# Patient Record
Sex: FEMALE | Race: WHITE | URBAN - METROPOLITAN AREA
[De-identification: names, ages, dates, MRNs, and addresses within clinical notes are randomized per-mention and may not be internally consistent; named-entity substitution may affect disease eponyms.]

---

## 2021-06-16 ENCOUNTER — OFFICE VISIT (OUTPATIENT)
Dept: NEUROSURGERY | Age: 56
End: 2021-06-16
Payer: COMMERCIAL

## 2021-06-16 VITALS — WEIGHT: 147 LBS | BODY MASS INDEX: 22.28 KG/M2 | HEIGHT: 68 IN | TEMPERATURE: 97.1 F

## 2021-06-16 DIAGNOSIS — M48.061 SPINAL STENOSIS OF LUMBAR REGION WITHOUT NEUROGENIC CLAUDICATION: ICD-10-CM

## 2021-06-16 DIAGNOSIS — M54.16 LUMBAR RADICULOPATHY: ICD-10-CM

## 2021-06-16 DIAGNOSIS — M51.26 LUMBAR DISC HERNIATION: Primary | ICD-10-CM

## 2021-06-16 DIAGNOSIS — M47.816 LUMBAR SPONDYLOSIS: ICD-10-CM

## 2021-06-16 DIAGNOSIS — M51.36 LUMBAR DEGENERATIVE DISC DISEASE: ICD-10-CM

## 2021-06-16 PROCEDURE — 99213 OFFICE O/P EST LOW 20 MIN: CPT | Performed by: NEUROLOGICAL SURGERY

## 2021-06-16 RX ORDER — GABAPENTIN 400 MG/1
400 CAPSULE ORAL 3 TIMES DAILY
Qty: 90 CAPSULE | Refills: 3 | Status: SHIPPED | OUTPATIENT
Start: 2021-06-16 | End: 2021-08-23 | Stop reason: SDUPTHER

## 2021-06-16 RX ORDER — GABAPENTIN 400 MG/1
400 CAPSULE ORAL 3 TIMES DAILY
Qty: 90 CAPSULE | Refills: 0 | Status: SHIPPED | OUTPATIENT
Start: 2021-06-16 | End: 2021-06-16 | Stop reason: SDUPTHER

## 2021-06-16 ASSESSMENT — ENCOUNTER SYMPTOMS
CONSTIPATION: 0
DIARRHEA: 0
BACK PAIN: 1
NAUSEA: 0
SHORTNESS OF BREATH: 0

## 2021-06-16 NOTE — PROGRESS NOTES
[facility] ([facility]) Physicians  Neurosurgery and Pain [address] [facility]: [phone]  F: [phone]       Flavio Mayfield  (1965)    6/16/2021    Referred by No ref. provider found    Subjective:     Flavio Mayfield is 64 y.o. female who complains today of:    Chief Complaint   Patient presents with    Back Pain     She is here for follow up with increased pain after stopping Neurontin. Feels worse compared to last visit. Previous physical therapy. [age]-year-old female with a five-year history of chronic back pain with similar radiation to the back and right leg which was relieved with conservative treatment. She has been suffering from intermittent back discomfort along with her diagnosis of rheumatoid arthritis. About a eight months ago without any preceding injury or trauma, there has been significant pain in the back and the right leg down to the foot with associated numbness and paresthesia. The pain is worse with activities and movement. No left-sided complaints. No bowel or bladder incontinence. She denies any weakness fortunately. She sees Dr. Alexandrea Gonzalez, pain management. She did cut her left finger on the knuckle at work today and wearing bandage due to excessive bleeding.             Back Pain  Associated symptoms include leg pain. Pertinent negatives include no fever or headaches. Leg Pain         Allergies:  Patient has no known allergies. No past medical history on file. Past Surgical History:   Procedure Laterality Date    KNEE SURGERY       No family history on file.   Social History     Socioeconomic History    Marital status:      Spouse name: Not on file    Number of children: Not on file    Years of education: Not on file    Highest education level: Not on file   Occupational History    Not on file   Tobacco Use    Smoking status: Former Smoker    Smokeless tobacco: Never Used   Substance and Sexual Activity    Alcohol use: Not Currently    Drug use: Not Currently    Sexual activity: Not on file   Other Topics Concern    Not on file   Social History Narrative    Not on file     Social Determinants of Health     Financial Resource Strain:     Difficulty of Paying Living Expenses:    Food Insecurity:     Worried About Running Out of Food in the Last Year:     [address] in the Last Year:    Transportation Needs:     Lack of Transportation (Medical):  Lack of Transportation (Non-Medical):    Physical Activity:     Days of Exercise per Week:     Minutes of Exercise per Session:    Stress:     Feeling of Stress :    Social Connections:     Frequency of Communication with Friends and Family:     Frequency of Social Gatherings with Friends and Family:     Attends [employer] Services:     Active Member of Clubs or Organizations:     Attends Club or Organization Meetings:     Marital Status:    Intimate Partner Violence:     Fear of Current or Ex-Partner:     Emotionally Abused:     Physically Abused:     Sexually Abused:        Current Outpatient Medications on File Prior to Visit   Medication Sig Dispense Refill    diclofenac (VOLTAREN) 50 MG EC tablet Take 1 tablet by mouth 2 times daily (with meals) Take with food, avoid other NSAIDs (Ibuprofen) and steroids 60 tablet 0    meloxicam (MOBIC) 15 MG tablet Take 1 tablet by mouth daily Take with food, avoid other NSAIDs (Ibuprofen) and steroids 30 tablet 0    lidocaine (LMX) 4 % cream Apply a half dollar sized amount to intact skin topically up to twice daily as needed for pain 1 Tube 1    sulfaSALAzine (AZULFIDINE) 500 MG tablet 1 tablet      methotrexate (RHEUMATREX) 2.5 MG chemo tablet       Lisdexamfetamine Dimesylate (VYVANSE PO) Take by mouth       No current facility-administered medications on file prior to visit. Review of Systems   Constitutional: Negative for fever. HENT: Negative for hearing loss. Respiratory: Negative for shortness of breath. Gastrointestinal: Negative for constipation, diarrhea and nausea. Genitourinary: Negative for difficulty urinating. Musculoskeletal: Positive for back pain. Negative for neck pain. Skin: Negative for rash. Neurological: Negative for headaches. Hematological: Does not bruise/bleed easily. Psychiatric/Behavioral: Negative for sleep disturbance. Objective:     Vitals:  Temp 97.1 °F (36.2 °C)   Ht 5' 8\" (1.727 m)   Wt 147 lb (66.7 kg)   BMI 22.35 kg/m² Pain Score:   5      Physical Exam  Vitals and nursing note reviewed. HENT:      Head: Normocephalic and atraumatic. Mouth/Throat:      Mouth: Mucous membranes are moist.      Pharynx: Oropharynx is clear. Eyes:      Extraocular Movements: Extraocular movements intact. Pupils: Pupils are equal, round, and reactive to light. Cardiovascular:      Rate and Rhythm: Regular rhythm. Pulses: Normal pulses. Musculoskeletal:      Lumbar back: Tenderness present. Decreased range of motion. Skin:     General: Skin is warm and dry. Neurological:      Mental Status: She is alert and oriented to person, place, and time. Sensory: Sensory deficit (hypalgesia on the right shin and dorsum of the foot ) present. Gait: Gait normal.      Deep Tendon Reflexes: Babinski sign (Downgoing.) absent on the right side. Babinski sign (Downgoing.) absent on the left side. Reflex Scores:       Patellar reflexes are 0 on the right side and 0 on the left side. Achilles reflexes are 0 on the right side and 0 on the left side. Comments: Wearing back brace. 5/5 bilateral quadriceps, iliopsoas, gastrocnemius, TA and EHL. Positive right straight leg-raise with positive Lasegue's. Negative bilateral straight leg-raise. No ankle clonus bilaterally. Please note that there is evidence of right-sided knee surgery.        Psychiatric:         Mood and Affect: Mood normal. Assessment:      Diagnosis Orders   1. Lumbar disc herniation     2. Lumbar radiculopathy  gabapentin (NEURONTIN) 400 MG capsule    DISCONTINUED: gabapentin (NEURONTIN) 400 MG capsule   3. Lumbar degenerative disc disease     4. Lumbar spondylosis     5. Spinal stenosis of lumbar region without neurogenic claudication         Plan:     Patient returns my office for continued follow-up. Has seen me previously with L4-5 severe degenerative changes spondylosis with mechanical back pain radiculopathy. Recent exacerbation of pain with a limited benefit from the injection after stopping gabapentin. Clinically unchanged stable motor and sensory findings. MRI of lumbar spine is again was reviewed by me. Impression: L4-5 severe lumbar spondylosis degenerative changes cause mechanical back pain and radiculopathy. Patient was given Neurontin refill hopefully this will help her continue to control the pain like she was during previous visit. She is to consider surgery of extensive decompression with a fusions mentation to prevent delayed instability at L4-5 as absolute last resort. And she is to continue follow-up with the pain management including further refills. Orders Placed This Encounter   Medications    DISCONTD: gabapentin (NEURONTIN) 400 MG capsule     Sig: Take 1 capsule by mouth 3 times daily for 30 days. Dispense:  90 capsule     Refill:  0    gabapentin (NEURONTIN) 400 MG capsule     Sig: Take 1 capsule by mouth 3 times daily for 30 days. Dispense:  90 capsule     Refill:  3     Cancel 1st rx with NO refills. Use this one. No orders of the defined types were placed in this encounter. Follow up:  No follow-ups on file.     Rogelio Salcedo MD

## 2021-07-14 DIAGNOSIS — M54.16 LUMBAR RADICULOPATHY: ICD-10-CM

## 2021-07-14 DIAGNOSIS — M54.2 NECK PAIN: ICD-10-CM

## 2021-07-14 RX ORDER — MELOXICAM 15 MG/1
15 TABLET ORAL DAILY
Qty: 30 TABLET | Refills: 0 | Status: SHIPPED | OUTPATIENT
Start: 2021-07-14 | End: 2021-08-23 | Stop reason: SDUPTHER

## 2021-08-10 DIAGNOSIS — M54.2 NECK PAIN: ICD-10-CM

## 2021-08-10 DIAGNOSIS — M54.16 LUMBAR RADICULOPATHY: ICD-10-CM

## 2021-08-10 NOTE — TELEPHONE ENCOUNTER
Requested Prescriptions     Pending Prescriptions Disp Refills    diclofenac (VOLTAREN) 50 MG EC tablet 60 tablet 0     Sig: Take 1 tablet by mouth 2 times daily (with meals) Take with food, avoid other NSAIDs (Ibuprofen) and steroids       Patient last seen on:  4/30  Date of last surgery:  n/a  Date of last refill:  7/14  Pain level:  n/a  Patient complaining of:  Pt asking for refill  Future appts: pt was called to schedule, she states she was just walking out of work and will call back to schedule.

## 2021-08-11 RX ORDER — GABAPENTIN 400 MG/1
400 CAPSULE ORAL 3 TIMES DAILY
Qty: 90 CAPSULE | Refills: 3 | OUTPATIENT
Start: 2021-08-11 | End: 2021-09-10

## 2021-08-23 ENCOUNTER — OFFICE VISIT (OUTPATIENT)
Dept: PAIN MANAGEMENT | Age: 56
End: 2021-08-23
Payer: COMMERCIAL

## 2021-08-23 VITALS
HEIGHT: 68 IN | BODY MASS INDEX: 23.19 KG/M2 | WEIGHT: 153 LBS | SYSTOLIC BLOOD PRESSURE: 135 MMHG | TEMPERATURE: 97.6 F | DIASTOLIC BLOOD PRESSURE: 80 MMHG

## 2021-08-23 DIAGNOSIS — M54.16 LUMBAR RADICULOPATHY: ICD-10-CM

## 2021-08-23 DIAGNOSIS — M54.2 NECK PAIN: ICD-10-CM

## 2021-08-23 DIAGNOSIS — M06.9 RHEUMATOID ARTHRITIS, INVOLVING UNSPECIFIED SITE, UNSPECIFIED WHETHER RHEUMATOID FACTOR PRESENT (HCC): ICD-10-CM

## 2021-08-23 DIAGNOSIS — M47.817 LUMBOSACRAL SPONDYLOSIS WITHOUT MYELOPATHY: Primary | ICD-10-CM

## 2021-08-23 PROCEDURE — 99214 OFFICE O/P EST MOD 30 MIN: CPT | Performed by: PHYSICAL MEDICINE & REHABILITATION

## 2021-08-23 RX ORDER — GABAPENTIN 400 MG/1
400 CAPSULE ORAL 4 TIMES DAILY
Qty: 120 CAPSULE | Refills: 0 | Status: SHIPPED | OUTPATIENT
Start: 2021-08-23 | End: 2021-10-05 | Stop reason: SDUPTHER

## 2021-08-23 ASSESSMENT — ENCOUNTER SYMPTOMS
NAUSEA: 0
BACK PAIN: 1
DIARRHEA: 0
SHORTNESS OF BREATH: 0
CONSTIPATION: 0

## 2021-08-23 NOTE — PROGRESS NOTES
Ita Burnette  (1965)    8/23/2021    Subjective:     Ita Burnette is 64 y.o. female who complains today of:    Chief Complaint   Patient presents with    Back Pain    Neck Pain    Leg Pain     Last seen by me 4/9/21: Saw Dr Donald Angela on 6/16/21 given Neurontin, consider fsion L4/5. Right Lumbar L4/5 L5/S1 TFESI on 4/30/21 with 50% pain relief. She wished these lasted longer. No new therapy. Didn't get XR neck done. Left arm is better, didn't get EMG done. Diclofenac has been very helpful. Had a hard time while off of this medicine. No other tests therapy or updates from other physicians, no ER visits. Diclofenac 50 mg BID and Gabapentin 400 mg QID help with remaining functional with chores, personal hygiene, remaining compliant with home exercise program, maintaining her quality of life, and performing activities of daily living. She obtains greater than 50% pain relief without any significant side effects. She is clear to avoid driving or operating heavy machinery or to perform any activities where she may harm herself or others while on pain medications. Low back pain is a 4/10. Gets to a 6/10. Located in the right low back. No leg pain. Worse with work and activity. Better with rest and pain medicine. Constant ache for over 4 years. There are no other associated symptoms or contextual factors. She denies any new weakness, saddle anesthesia, bowel or bladder dysfunction, or falls. Neck pain is currently a 4/10. Gets to a 6/10. Located in both sides of her neck. No longer having left arm pain. Better with rest. Worse with work and activity. Has a history of rheumatoid arthritis. There are no other associated symptoms or contextual factors. She denies any classic radicular symptoms, new weakness, saddle anesthesia, bowel or bladder dysfunction, or falls. Allergies:  Patient has no known allergies. History reviewed. No pertinent past medical history.   Past Surgical History:   Procedure Laterality Date    KNEE SURGERY       History reviewed. No pertinent family history. Social History     Socioeconomic History    Marital status:      Spouse name: Not on file    Number of children: Not on file    Years of education: Not on file    Highest education level: Not on file   Occupational History    Not on file   Tobacco Use    Smoking status: Never Smoker    Smokeless tobacco: Never Used   Substance and Sexual Activity    Alcohol use: Not Currently    Drug use: Not Currently    Sexual activity: Not on file   Other Topics Concern    Not on file   Social History Narrative    Not on file     Social Determinants of Health     Financial Resource Strain:     Difficulty of Paying Living Expenses:    Food Insecurity:     Worried About Running Out of Food in the Last Year:     [address] in the Last Year:    Transportation Needs:     Lack of Transportation (Medical):      Lack of Transportation (Non-Medical):    Physical Activity:     Days of Exercise per Week:     Minutes of Exercise per Session:    Stress:     Feeling of Stress :    Social Connections:     Frequency of Communication with Friends and Family:     Frequency of Social Gatherings with Friends and Family:     Attends Holiness Services:     Active Member of Clubs or Organizations:     Attends Club or Organization Meetings:     Marital Status:    Intimate Partner Violence:     Fear of Current or Ex-Partner:     Emotionally Abused:     Physically Abused:     Sexually Abused:        Current Outpatient Medications on File Prior to Visit   Medication Sig Dispense Refill    lidocaine (LMX) 4 % cream Apply a half dollar sized amount to intact skin topically up to twice daily as needed for pain 1 Tube 1    sulfaSALAzine (AZULFIDINE) 500 MG tablet 1 tablet      methotrexate (RHEUMATREX) 2.5 MG chemo tablet       Lisdexamfetamine Dimesylate (VYVANSE PO) Take by mouth       No current facility-administered medications on file prior to visit.       She has not tried physical therapy. Date of lastof last PT treatment: none    Back Pain  Associated symptoms include leg pain. Pertinent negatives include no fever or headaches. Neck Pain   Associated symptoms include leg pain. Pertinent negatives include no fever or headaches. Leg Pain         Review of Systems   Constitutional: Negative for fever. HENT: Negative for hearing loss. Respiratory: Negative for shortness of breath. Gastrointestinal: Negative for constipation, diarrhea and nausea. Genitourinary: Negative for difficulty urinating. Musculoskeletal: Positive for back pain and neck pain. Skin: Negative for rash. Neurological: Negative for headaches. Hematological: Does not bruise/bleed easily. Psychiatric/Behavioral: Negative for sleep disturbance. Objective:     Vitals:  /80   Temp 97.6 °F (36.4 °C)   Ht 5' 8\" (1.727 m)   Wt 153 lb (69.4 kg)   BMI 23.26 kg/m² Pain Score:   4      Exam performed under coronavirus precautions  General: No acute distress  Eyes: No scleral icterus or lid lag appreciated bilaterally  ENMT: Moist mucous membranes. Hearing grossly intact bilaterally. No masses or lesions on ears or nose  Neck: Symmetric without any overt masses or lesions, trachea midline  Respiratory: Respirations are non-labored  Skin: Visualized skin is intact  Psych: Mood normal. Affect normal. Recent and remote memory intact. Judgment and insight normal.  Neurologic: Gait antalgic, no assistive devices for ambulation. Pt is alert and appropriately interactive. Pleasant and cooperative with history and exam. No signs of excessive sedation. Responds promptly and appropriately to questions asked. No aberrant behaviors appreciated.     Sensation grossly intact  paresthesias  Reflexes and strength functional for ambulation, no abnormal reflexes appreciated on exam today  Strength greater than 3/5 bilateral legs  Straight leg raise positive on exam today Sensation intact in both arms   Reflexes and strength functional for arm use, no abnormal reflexes appreciated on exam today  Strength greater than 3/5 in both arms  Spurling's negative on exam today    Tender left cervical paraspinals    There is tenderness to palpation over the lower lumbar spinous processes from L2 down to sacrum with right paraspinal muscle tenderness. Rotation and extension reproduces axial low back pain. Other facet provocative maneuvers are positive. EMG B LE 12/17/20: This study is limited, but normal. There is no current electrodiagnostic evidence for a generalized large fiber sensorimotor peripheral polyneuropathy. Although limited, there is no clear evidence for a bilateral active lumbosacral motor radiculopathy. MRI LS Spine 11/2/20: levo convexity, subluxation L4/5, DDD and facet arthroapthy. T12/L1 normal canal and foramen. L1/2 mild canal stenosis, normal foramen. L2/3 moderate canal stenosis, normal foramen. L3/4 moderate canal stenosis, disc protrusion, contact left L3 nerve root, normal right foramen. L4/5 moderate canal stenosis, contact right L5 neve root. L5/S1 normal canal and foramen. XR LS Spine 9/17/20: no fracture, facet arthroapthy, degenerative disc disease. UDS/ODS 12/16/2020: Positive marijuana, consistent with gabapentin, consistent with amphetamine prescription  Opioid risk tool score 2, low risk  Assessment:      Diagnosis Orders   1. Lumbosacral spondylosis without myelopathy  ME INJ DX/THER AGNT PARAVERT FACET JOINT, LUMBAR/SAC, 1ST LEVEL    ME INJ DX/THER AGNT PARAVERT FACET JOINT, LUMBAR/SAC, 2ND LEVEL   2. Lumbar radiculopathy  gabapentin (NEURONTIN) 400 MG capsule    diclofenac (VOLTAREN) 50 MG EC tablet   3. Neck pain  diclofenac (VOLTAREN) 50 MG EC tablet    XR CERVICAL SPINE (2-3 VIEWS)    XR CERVICAL SPINE W OBLIQUES FLEXION AND EXTENSION    Amb External Referral To Physical Therapy   4.  Rheumatoid arthritis, involving unspecified site, unspecified whether rheumatoid factor present (HCC)  XR CERVICAL SPINE (2-3 VIEWS)    XR CERVICAL SPINE W OBLIQUES FLEXION AND EXTENSION     +Rheumatoid Arthritis followed by Dr Maurilio Grimes:     Periodic Controlled Substance Monitoring: Obtaining appropriate analgesic effect of treatment. Omar Denis MD)    Orders Placed This Encounter   Medications    gabapentin (NEURONTIN) 400 MG capsule     Sig: Take 1 capsule by mouth 4 times daily for 30 days. Dispense:  120 capsule     Refill:  0     Cancel 1st rx with NO refills. Use this one.  diclofenac (VOLTAREN) 50 MG EC tablet     Sig: Take 1 tablet by mouth 2 times daily (with meals) Take with food, avoid other NSAIDs (Ibuprofen) and steroids     Dispense:  60 tablet     Refill:  0       Orders Placed This Encounter   Procedures    XR CERVICAL SPINE (2-3 VIEWS)     Standing Status:   Future     Standing Expiration Date:   8/23/2022     Order Specific Question:   Reason for exam:     Answer:   Please evaluate for the presence of cervical facet arthropathy    XR CERVICAL SPINE W OBLIQUES FLEXION AND EXTENSION     Standing Status:   Future     Standing Expiration Date:   8/23/2022    Amb External Referral To Physical Therapy     Referral Priority:   Routine     Referral Type:   Consult for Advice and Opinion     Referral Reason:   Specialty Services Required     Requested Specialty:   Physical Therapy     Number of Visits Requested:   1    [place] INJ DX/THER AGNT PARAVERT FACET JOINT, LUMBAR/SAC, 1ST LEVEL     Right Lumbar L4/5 L5/S1 facet injection under XR with Dr Ginger Roche. +Asa81 mg okay, antibiotics, no diabetes mellitus, no osteoporosis, no bleeding or platelet dysfunction, IV Dye okay, NKDA. Not pregnant. 15 minute procedure. Standing Status:   Future     Standing Expiration Date:   11/21/2021    [place] INJ DX/THER AGNT PARAVERT FACET JOINT, LUMBAR/SAC, 2ND LEVEL     Right Lumbar L4/5 L5/S1 facet injection under XR with Dr Ginger Roche.  +Asa81 mg okay, antibiotics, no diabetes mellitus, no osteoporosis, no bleeding or platelet dysfunction, IV Dye okay, NKDA. Not pregnant. 15 minute procedure. Standing Status:   Future     Standing Expiration Date:   11/21/2021     -PT for neck pain  -Re XR C Spine ap lat flexion-extension eval facet arthroapthy, instability given rheumatoid arthritis history  -Will hold on EMG B UE as her left arm pain is resolved  -Low threshold for MRI cervical spine without contrast given her history of rheumatoid arthritis and concern for upper cervical instability  -Continue Diclofenac 50 mg BIDWM #60 no refills start 8/23/2021 Avoid all other NSAIDs and steroids.  -Continue Gabapentin 400 mg QID #120 no refills start 8/23/2021 [place] reviewed on 8/23/2021   -No further opioids recommended, she is not interested in opioids, she prefers to use marijuana gummies at this time  -Right Lumbar L4/5 L5/S1 facet injection under XR with Dr Jono Coon. +Asa81 mg okay, antibiotics, no diabetes mellitus, no osteoporosis, no bleeding or platelet dysfunction, IV Dye okay, NKDA. Not pregnant. 15 minute procedure. Prone position Consider 3 mg betamethasone  -If minimal relief consider lumbar right-sided facet treatment. After image review consider cervical facet treatment.  -Sparing use of back brace    Controlled Substance Monitoring:    Acute and Chronic Pain Monitoring:   RX Monitoring 8/23/2021   Periodic Controlled Substance Monitoring Obtaining appropriate analgesic effect of treatment. Chronic Pain > 50 MEDD -        Discussed the risks, side effects, and symptoms that would warrant urgent or emergent physician evaluation of all medications prescribed today. The patient was advised that NSAID-type medications have three important potential side effects: gastrointestinal irritation including hemorrhage, myocardial injury including possible infarction, and kidney injury.  She was asked to take the medication with food, avoid any other NSAIDs or steroids, and discontinue if she develops any concerning symptoms. She should immediately stop the medication and proceed to the emergency department for chest pain, dark urine or difficulty with producing urine, vomiting, abdominal pain or black/tarry stools. The patient expresses understanding of these issues and all questions were answered. Discussed the risks of the above recommended procedures including but not limited to bleeding, infection, worsened pain, damage to surrounding structures, side effects, toxicity, allergic reactions to medications used, immune and stress-response dysfunction, fat necrosis, skin pigmentation changes, blood sugar elevation, headache, vision changes, need for surgery, as well as catastrophic injury such as vision loss, paralysis, stroke, spinal cord and/or plexus infarction or injury, intrathecal injection, spinal cord puncture, arachnoiditis, discitis, bowel or bladder incontinence, ventilator dependence, loss of use of the arms and/or legs, and death. Discussed the risks, benefits, alternative procedures, and alternatives to the procedure including no procedure at all. Discussed that we cannot undo any permanent neurologic damage or change the course of any underlying disease. After thorough discussion, patient expressed understanding and willingness to proceed. Provided education and counseling regarding the diagnosis, prognosis, and treatment options. All questions were answered. Encouraged her to follow-up with her primary care physician and/or specialists as required for her overall health and management of her comorbidities as well as any new positive symptoms mentioned in review of systems above. Care was provided within the definitions and limitations of our specialty practice.  Encouraged lifestyle interventions including healthy habits, lifestyle changes, regular aerobic exercise and appropriate weight maintenance as advised by their primary care physician or cardiovascular health provider. Discussed well care and disease prevention/maintenance. Anatomic spine model was used to illustrate pathology. All recommendations for therapy are provided to improve function with activities of daily living, decrease pain, and help develop an exercise program. All recommendations for medications are meant to help decrease pain, improve function with activities of daily living, maintain compliance with home exercise program, and improve quality of life. All recommendations for therapeutic injections are meant to help decrease pain, improve function with activities of daily living, maintain compliance with home exercise program, improve quality of life, and decrease reliance upon oral medications. Encouraged compliance with her home exercise program. Recommended compliance with physical therapy program as outlined above. Informed her to wear bracing as appropriate, and that bracing is not a replacement for core strengthening or muscle stabilization. Discussed the elevated risks of excessive sedation while on pain medications. Advised her against driving or operating heavy machinery or performing any activities where she may harm herself or others while on pain medications. Particular caution was emphasized especially during dose adjustments and medication changes. Discussed the risks of temporary disability, permanent disability, morbidity, and mortality with poorly-managed or undiagnosed medical conditions and comorbidities. Emphasized the importance of timely medical evaluation and treatment as previously recommended by us or other medical professionals. Risks of not pursing these recommendations were emphasized. Advised her that any lab testing, imaging, or other diagnostic test results are best discussed in person in the office so that we can provide a clear explanation of their significance and best treatment based upon these results.  It is her responsibility to make and keep a follow up appointment to discuss these test results in person to discuss the significance of the findings and appropriate follow-up steps. She expressed complete understanding and agreement with the entire plan as outlined above. Portions of this note may have been typed, auto-populated, dictated or transcribed by voice recognition resulting in errors, omissions, or close substitutions which may be missed despite careful proofreading. Please contact the author for any questions or concerns. Follow up:  Return in about 1 month (around 9/23/2021) for reassessment of pain and symptoms, P.T. External Ref.     Toni Duval MD

## 2021-08-24 ENCOUNTER — TELEPHONE (OUTPATIENT)
Dept: PAIN MANAGEMENT | Age: 56
End: 2021-08-24

## 2021-08-24 NOTE — TELEPHONE ENCOUNTER
ORDER PLACED:    Date: 8/23/21  Description: RIGHT L4-5,L5-S1 FACET  Order Number: [id number]  Ordering Provider: [facility]  Performing Provider: [facility]  CPT Codes: 28925,40201  ICD10 Codes: W98.699

## 2021-08-24 NOTE — TELEPHONE ENCOUNTER
BENEFITS:    Insurance: [facility]  Phone: [phone]  Contact Name: ZARA Jarrell Date: 3/1/16     Plan year: CAL YEAR  Deductible: $550      Deductible Met: $79.10  Allowed/benefits paid at: 80% AFTER DED MET  OOP: $2500 WITH $0 MET  Freq Limits: BASED ON MEDICAL NECESSITY  Prior Auth Requirement: NO AUTH REQUIRED    Notes:     Call Reference #: [id number]    Time of call: 9:17AM

## 2021-08-27 DIAGNOSIS — M06.9 RHEUMATOID ARTHRITIS, INVOLVING UNSPECIFIED SITE, UNSPECIFIED WHETHER RHEUMATOID FACTOR PRESENT (HCC): ICD-10-CM

## 2021-08-27 DIAGNOSIS — M54.2 NECK PAIN: ICD-10-CM

## 2021-09-14 ENCOUNTER — PROCEDURE VISIT (OUTPATIENT)
Dept: PAIN MANAGEMENT | Age: 56
End: 2021-09-14
Payer: COMMERCIAL

## 2021-09-14 DIAGNOSIS — M47.817 LUMBOSACRAL SPONDYLOSIS WITHOUT MYELOPATHY: ICD-10-CM

## 2021-09-14 PROCEDURE — 64494 INJ PARAVERT F JNT L/S 2 LEV: CPT | Performed by: PHYSICAL MEDICINE & REHABILITATION

## 2021-09-14 PROCEDURE — 64493 INJ PARAVERT F JNT L/S 1 LEV: CPT | Performed by: PHYSICAL MEDICINE & REHABILITATION

## 2021-09-14 RX ORDER — LIDOCAINE HYDROCHLORIDE 10 MG/ML
3 INJECTION, SOLUTION EPIDURAL; INFILTRATION; INTRACAUDAL; PERINEURAL ONCE
Status: COMPLETED | OUTPATIENT
Start: 2021-09-14 | End: 2021-09-14

## 2021-09-14 RX ORDER — BETAMETHASONE SODIUM PHOSPHATE AND BETAMETHASONE ACETATE 3; 3 MG/ML; MG/ML
3 INJECTION, SUSPENSION INTRA-ARTICULAR; INTRALESIONAL; INTRAMUSCULAR; SOFT TISSUE ONCE
Status: COMPLETED | OUTPATIENT
Start: 2021-09-14 | End: 2021-09-14

## 2021-09-14 RX ADMIN — Medication 0.5 MEQ: at 14:55

## 2021-09-14 RX ADMIN — LIDOCAINE HYDROCHLORIDE 3 ML: 10 INJECTION, SOLUTION EPIDURAL; INFILTRATION; INTRACAUDAL; PERINEURAL at 14:55

## 2021-09-14 RX ADMIN — BETAMETHASONE SODIUM PHOSPHATE AND BETAMETHASONE ACETATE 3 MG: 3; 3 INJECTION, SUSPENSION INTRA-ARTICULAR; INTRALESIONAL; INTRAMUSCULAR; SOFT TISSUE at 14:54

## 2021-09-14 NOTE — PROGRESS NOTES
LUMBAR FACET ZYGAPOPHYSEAL JOINT INJECTIONS             Patient Name: Lana Candelario   : 1965  Date: 2021     Provider: Anna Berry MD      Lana Candelario is here today for interventional pain management. Preoperatively, the patient presents with symptoms and physical exam findings consistent with lumbar facet zygapophyseal joint mediated pain. She has had persistent pain that limits her function and activities of daily living. The pain is persistent despite conservative measures. She has significant functional and psychological impairment due to this condition. Given her symptoms, physical exam findings, impairment in activities of daily living, and lack of response to conservative measures, consideration for lumbar facet zygapophyseal joint corticosteroid injections was given. Discussed the risks of the procedure including, but not limited to, bleeding, infection, worsened pain, damage to surrounding structures, side effects, toxicity, allergic reactions to medications used, immune and stress-response dysfunction, fat necrosis, avascular necrosis, skin pigmentation changes, blood sugar elevation, headache, vision changes, need for surgery, as well as catastrophic injury such as vision loss, paralysis, stroke, spinal cord infarction or injury, intrathecal injection, spinal cord puncture, arachnoiditis, bowel or bladder incontinence, loss of use of the legs, ventilator dependence, and death. Discussed the risks, benefits, alternative procedures, and alternatives to the procedure including no procedure at all. Discussed that we cannot undo any permanent neurologic damage or change the course of any underlying disease. After thorough discussion, patient expressed understanding and willingness to proceed. Written consent was obtained and is in the chart. Verbal consent to proceed was obtained. Standard ASI guidelines were followed and sterile technique used. Area was cleaned with Betadine three times. Fluoroscopic guidance was used for this procedure. The L5 vertebral body was taken as the first lumbar-appearing vertebral body directly above the sacrum on a lateral view. Appropriate oblique views were used to open up the facet joints and two 26 gauge 3.5 inch spinal needles were used and each needle was advanced to each facet joint. Negative aspiration was achieved. Oblique and lateral views were obtained. A 3 mL injectate containing 0.5 mL of 3 mg of betamethasone and 0.5 mL of 1% preservative free Lidocaine was equally divided and injected into the joints without difficulty. She tolerated the procedure well, no obvious complications occurred during the procedure. She was appropriately monitored and discharged home in stable condition with her usual motor strength. Postoperative instructions were provided. She will continue anticoagulation uninterrupted.            [] Bilateral [] T12-L1    [] L1-2   [x] Right [] L2-3    [] L3-4   [] Left [x] L4-5      [x] L5-S1              [place], [facility] [address]  Phone [phone]

## 2021-09-30 DIAGNOSIS — M54.2 NECK PAIN: ICD-10-CM

## 2021-09-30 DIAGNOSIS — M54.16 LUMBAR RADICULOPATHY: ICD-10-CM

## 2021-09-30 NOTE — TELEPHONE ENCOUNTER
Requested Prescriptions     Pending Prescriptions Disp Refills    diclofenac (VOLTAREN) 50 MG EC tablet 60 tablet 0     Sig: Take 1 tablet by mouth 2 times daily (with meals) Take with food, avoid other NSAIDs (Ibuprofen) and steroids       Patient last seen on:  9/14  Date of last surgery:  N/A  Date of last refill:  8/23  Pain level:  N/A  Patient complaining of:  Pt asking for refill.    Future appts: 10/12

## 2021-10-05 DIAGNOSIS — M54.16 LUMBAR RADICULOPATHY: ICD-10-CM

## 2021-10-05 NOTE — TELEPHONE ENCOUNTER
Requested Prescriptions     Pending Prescriptions Disp Refills    gabapentin (NEURONTIN) 400 MG capsule 120 capsule 0     Sig: Take 1 capsule by mouth 4 times daily for 30 days.        Patient last seen on:  9/14/21  Date of last surgery:  n/a  Date of last refill:  8/23/21  Pain level:  n/a  Patient complaining of:  Pt requesting refill  Future appts: 10/12/21

## 2021-10-06 RX ORDER — GABAPENTIN 400 MG/1
400 CAPSULE ORAL 4 TIMES DAILY
Qty: 28 CAPSULE | Refills: 0 | Status: SHIPPED | OUTPATIENT
Start: 2021-10-06 | End: 2021-10-12 | Stop reason: SDUPTHER

## 2021-10-12 ENCOUNTER — OFFICE VISIT (OUTPATIENT)
Dept: NEUROSURGERY | Age: 56
End: 2021-10-12
Payer: COMMERCIAL

## 2021-10-12 VITALS — TEMPERATURE: 97.1 F | BODY MASS INDEX: 23.19 KG/M2 | HEIGHT: 68 IN | WEIGHT: 153 LBS

## 2021-10-12 DIAGNOSIS — M54.2 NECK PAIN: ICD-10-CM

## 2021-10-12 DIAGNOSIS — M54.16 LUMBAR RADICULOPATHY: ICD-10-CM

## 2021-10-12 DIAGNOSIS — M47.817 LUMBOSACRAL SPONDYLOSIS WITHOUT MYELOPATHY: Primary | ICD-10-CM

## 2021-10-12 PROCEDURE — 99214 OFFICE O/P EST MOD 30 MIN: CPT | Performed by: NURSE PRACTITIONER

## 2021-10-12 RX ORDER — GABAPENTIN 400 MG/1
400 CAPSULE ORAL 4 TIMES DAILY
Qty: 120 CAPSULE | Refills: 0 | Status: SHIPPED | OUTPATIENT
Start: 2021-10-12 | End: 2021-11-11

## 2021-10-12 ASSESSMENT — ENCOUNTER SYMPTOMS
DIARRHEA: 0
RESPIRATORY NEGATIVE: 1
BACK PAIN: 1
CONSTIPATION: 0

## 2021-10-12 NOTE — PATIENT INSTRUCTIONS
Patient Education        Learning About Medial Branch Block and Neurotomy  What are medial branch block and neurotomy? Facet joints connect your vertebrae to each other. Problems in these joints can cause chronic (long-term) pain in the neck or back. Medial branch nerves are the nerves that carry many of the pain messages from your facet joints. Radiofrequency medial branch neurotomy is a type of medial branch neurotomy that is used to relieve arthritis pain. It uses radio waves to damage nerves in your neck or back so that they can no longer send pain messages to your brain. Before your doctor knows if a neurotomy will help you, you will get a medial branch block to find out if certain nerves are the ones that are a source of your pain. You will need two separate visits to the outpatient center or hospital to have both procedures. You will need someone to drive you home. How is a medial branch block done? The doctor will use a tiny needle to numb the skin where you will get the block. Then the doctor puts the block needle into the numbed area. You may feel some pressure, but you should not feel pain. Using fluoroscopy (live X-ray) to guide the needle, the doctor injects medicine onto one or more nerves to make them numb. If you get relief from your pain in the next 4 to 6 hours, it's a sign that those nerves may be contributing to your pain. The relief will last only a short time. You may then have a medial branch neurotomy at a later visit to try to get longer relief. How is a medial branch neurotomy done? The doctor will use a tiny needle to numb the skin where you will get the neurotomy. Then the doctor puts the neurotomy needle into the numbed area. You may feel some pressure. Using fluoroscopy (live X-ray) to guide the needle, the doctor sends radio waves through the needle to the nerve for 60 to 90 seconds. The radio waves heat the nerve, which damages it. The doctor may do this several times. And more than one nerve may be treated. How long do medial branch block and neurotomy take? It takes 20 to 30 minutes to get the block. You can go home after the doctor watches you for about an hour. It takes 45 to 90 minutes to get a neurotomy, depending on how many nerves are heated. You will probably go home 30 to 60 minutes after the procedure. What can you expect after a neurotomy? You will get instructions on how to report how much pain you have when you are at home. You may feel a little sore or tender at the injection site at first. But after a successful neurotomy, most people have pain relief right away. It often lasts for several months, but your pain may come back. If your pain does come back, it may mean that the damaged nerve has healed and can send pain messages again. Or it can mean that a different nerve is causing pain. Your doctor will discuss your options with you. Follow-up care is a key part of your treatment and safety. Be sure to make and go to all appointments, and call your doctor if you are having problems. It's also a good idea to know your test results and keep a list of the medicines you take. Where can you learn more? Go to [email or web address]. org and sign in to your [employer] account. Enter Q808 in the [employer] box to learn more about \"Learning About Medial Branch Block and Neurotomy. \"     If you do not have an account, please click on the \"Sign Up Now\" link. Current as of: April 8, 2021               Content Version: 13.0  © 2006-2021 [employer]. Care instructions adapted under license by [facility] ([facility]). If you have questions about a medical condition or this instruction, always ask your healthcare professional. [address] any warranty or liability for your use of this information.

## 2021-10-12 NOTE — PROGRESS NOTES
Patient: Oracio Guevara  YOB: 1965  Date: 10/12/21        Subjective:     Oracio Guevara is a 64 y.o. female who complains today of:    Chief Complaint   Patient presents with    Back Pain    Injections     PT HAD INJECTIONS 9/14/2021 W DR. SHEPARD    Neck Pain         Allergies:  Patient has no known allergies. No past medical history on file. Past Surgical History:   Procedure Laterality Date    KNEE SURGERY       No family history on file. Social History     Socioeconomic History    Marital status:      Spouse name: Not on file    Number of children: Not on file    Years of education: Not on file    Highest education level: Not on file   Occupational History    Not on file   Tobacco Use    Smoking status: Never Smoker    Smokeless tobacco: Never Used   Substance and Sexual Activity    Alcohol use: Not Currently    Drug use: Not Currently    Sexual activity: Not on file   Other Topics Concern    Not on file   Social History Narrative    Not on file     Social Determinants of Health     Financial Resource Strain:     Difficulty of Paying Living Expenses:    Food Insecurity:     Worried About Running Out of Food in the Last Year:     [address] in the Last Year:    Transportation Needs:     Lack of Transportation (Medical):      Lack of Transportation (Non-Medical):    Physical Activity:     Days of Exercise per Week:     Minutes of Exercise per Session:    Stress:     Feeling of Stress :    Social Connections:     Frequency of Communication with Friends and Family:     Frequency of Social Gatherings with Friends and Family:     Attends [employer] Services:     Active Member of Clubs or Organizations:     Attends Club or Organization Meetings:     Marital Status:    Intimate Partner Violence:     Fear of Current or Ex-Partner:     Emotionally Abused:     Physically Abused:     Sexually Abused:        Current Outpatient Medications on File Prior to Visit Medication Sig Dispense Refill    lidocaine (LMX) 4 % cream Apply a half dollar sized amount to intact skin topically up to twice daily as needed for pain 1 Tube 1    sulfaSALAzine (AZULFIDINE) 500 MG tablet 1 tablet      methotrexate (RHEUMATREX) 2.5 MG chemo tablet       Lisdexamfetamine Dimesylate (VYVANSE PO) Take by mouth       No current facility-administered medications on file prior to visit. 9/14/21 lumbar facet right L4-5, 5 S1 WITH NO BACK PAIN RELIEF BUT SHE DOES NOT GET RIGHT  LEG PAIN ANYMORE    Last seen by me 4/9/21: Saw Dr Khris Coppola on 6/16/21 given Neurontin, consider fsion L4/5. Right Lumbar L4/5 L5/S1 TFESI on 4/30/21 with 50% pain relief. She wished these lasted longer. No new therapy. Diclofenac has been very helpful. Had a hard time while off of this medicine. No other tests therapy or updates from other physicians, no ER visits. Diclofenac 50 mg BID and Gabapentin 400 mg QID (all 4 at bedtime) help with remaining functional with chores, personal hygiene, remaining compliant with home exercise program, maintaining her quality of life, and performing activities of daily living. Low back pain is a 5/10. Gets to a 6/10. Located in the right low back. No leg pain. Worse with work and activity. Better with rest and pain medicine. Constant ache for over 4 years. There are no other associated symptoms or contextual factors. She denies any new weakness, saddle anesthesia, bowel or bladder dysfunction, or falls.     Neck pain is currently a 4/10. Gets to a 6/10. Located in both sides of her neck. No longer having left arm pain. Better with rest. Worse with work and activity. Has a history of rheumatoid arthritis. There are no other associated symptoms or contextual factors. She denies any classic radicular symptoms, new weakness, saddle anesthesia, bowel or bladder dysfunction, or falls. She follows with Dr. Gabbie Gillespie and states that the current medication regimen is the best she has had. She states that procedures done here and previously at other pain management have never helped her. She does not want to continue with procedures. We reviewed her cervical x-ray. Suggested an MRI now or in the future and she does not want surgery. Back Pain  Pertinent negatives include no headaches, numbness or weakness. Review of Systems   Constitutional: Negative. Negative for activity change and unexpected weight change. HENT: Negative. Negative for hearing loss. Respiratory: Negative. Cardiovascular: Negative for leg swelling. Gastrointestinal: Negative for constipation and diarrhea. Genitourinary: Negative. Musculoskeletal: Positive for back pain and neck pain. Negative for gait problem and joint swelling. Skin: Negative for rash. Neurological: Negative for dizziness, weakness, numbness and headaches. Psychiatric/Behavioral: Negative. Negative for sleep disturbance. Objective:     Vitals:  Temp 97.1 °F (36.2 °C)   Ht 5' 8\" (1.727 m)   Wt 153 lb (69.4 kg)   BMI 23.26 kg/m² Pain Score:   5    Physical Exam  Vitals reviewed. Constitutional:       Appearance: She is well-developed. HENT:      Head: Normocephalic. Nose: Nose normal.   Pulmonary:      Effort: Pulmonary effort is normal.   Musculoskeletal:      Cervical back: Neck supple. Tenderness present. Pain with movement present. Decreased range of motion. Lumbar back: Tenderness present. Decreased range of motion. Negative right straight leg raise test and negative left straight leg raise test.      Comments: Difficulty sitting long, stands throughout evaluation  Difficulty getting up from chair   Lymphadenopathy:      Cervical: No cervical adenopathy. Skin:     General: Skin is warm and dry. Findings: No erythema or rash. Neurological:      Mental Status: She is alert and oriented to person, place, and time. Cranial Nerves: No cranial nerve deficit.       Deep Tendon Reflexes: Reflexes are normal and symmetric. Reflexes normal.   Psychiatric:         Mood and Affect: Mood normal.         Behavior: Behavior normal.         Thought Content: Thought content normal.         Judgment: Judgment normal.          EMG B LE 12/17/20: This study is limited, but normal. There is no current electrodiagnostic evidence for a generalized large fiber sensorimotor peripheral polyneuropathy. Although limited, there is no clear evidence for a bilateral active lumbosacral motor radiculopathy.   MRI LS Spine 11/2/20: levo convexity, subluxation L4/5, DDD and facet arthroapthy. T12/L1 normal canal and foramen. L1/2 mild canal stenosis, normal foramen. L2/3 moderate canal stenosis, normal foramen. L3/4 moderate canal stenosis, disc protrusion, contact left L3 nerve root, normal right foramen. L4/5 moderate canal stenosis, contact right L5 neve root. L5/S1 normal canal and foramen. XR LS Spine 9/17/20: no fracture, facet arthroapthy, degenerative disc disease.     Assessment:        Diagnosis Orders   1. Lumbosacral spondylosis without myelopathy     2. Lumbar radiculopathy  gabapentin (NEURONTIN) 400 MG capsule    diclofenac (VOLTAREN) 50 MG EC tablet   3. Neck pain  diclofenac (VOLTAREN) 50 MG EC tablet       Plan:          Orders Placed This Encounter   Medications    gabapentin (NEURONTIN) 400 MG capsule     Sig: Take 1 capsule by mouth 4 times daily for 30 days. Dispense:  120 capsule     Refill:  0     Cancel 1st rx with NO refills. Use this one.  diclofenac (VOLTAREN) 50 MG EC tablet     Sig: Take 1 tablet by mouth 2 times daily (with meals) Take with food, avoid other NSAIDs (Ibuprofen) and steroids     Dispense:  60 tablet     Refill:  0       No orders of the defined types were placed in this encounter.     I gave her a refill of gabapentin and diclofenac.  -We discussed MBB for both cervical and lumbar and she will consider  -She may have Dr. Franklyn Rudolph continue the above refills and return on a as